# Patient Record
Sex: FEMALE | Race: OTHER | HISPANIC OR LATINO | Employment: FULL TIME | URBAN - METROPOLITAN AREA
[De-identification: names, ages, dates, MRNs, and addresses within clinical notes are randomized per-mention and may not be internally consistent; named-entity substitution may affect disease eponyms.]

---

## 2019-01-06 ENCOUNTER — OFFICE VISIT (OUTPATIENT)
Dept: URGENT CARE | Age: 24
End: 2019-01-06
Payer: COMMERCIAL

## 2019-01-06 VITALS
WEIGHT: 132 LBS | RESPIRATION RATE: 16 BRPM | BODY MASS INDEX: 26.61 KG/M2 | TEMPERATURE: 99.2 F | SYSTOLIC BLOOD PRESSURE: 119 MMHG | HEIGHT: 59 IN | OXYGEN SATURATION: 99 % | DIASTOLIC BLOOD PRESSURE: 68 MMHG | HEART RATE: 97 BPM

## 2019-01-06 DIAGNOSIS — J02.0 STREP PHARYNGITIS: Primary | ICD-10-CM

## 2019-01-06 LAB — S PYO AG THROAT QL: NEGATIVE

## 2019-01-06 PROCEDURE — 87070 CULTURE OTHR SPECIMN AEROBIC: CPT | Performed by: PHYSICIAN ASSISTANT

## 2019-01-06 PROCEDURE — G0382 LEV 3 HOSP TYPE B ED VISIT: HCPCS | Performed by: PHYSICIAN ASSISTANT

## 2019-01-06 PROCEDURE — 87430 STREP A AG IA: CPT | Performed by: PHYSICIAN ASSISTANT

## 2019-01-06 PROCEDURE — 87147 CULTURE TYPE IMMUNOLOGIC: CPT | Performed by: PHYSICIAN ASSISTANT

## 2019-01-06 RX ORDER — AMOXICILLIN 500 MG/1
500 CAPSULE ORAL EVERY 8 HOURS SCHEDULED
Qty: 21 CAPSULE | Refills: 0 | Status: SHIPPED | OUTPATIENT
Start: 2019-01-06 | End: 2019-01-13

## 2019-01-06 NOTE — LETTER
January 6, 2019     Patient: Ruby Rausch   YOB: 1995   Date of Visit: 1/6/2019       To Whom It May Concern: It is my medical opinion that Ruby Rausch may return to work on 01/08/2019  If you have any questions or concerns, please don't hesitate to call           Sincerely,        Demetris Tapia PA-C    CC: No Recipients

## 2019-01-06 NOTE — PROGRESS NOTES
[facility]        NAME: Marbella Cummings is a 20 y o  female  : 1995    MRN: [id number]  DATE: 2019  TIME: 11:24 AM    Assessment and Plan   Strep pharyngitis [J02 0]  1  Strep pharyngitis  POCT rapid strepA    Throat culture    amoxicillin (AMOXIL) 500 mg capsule         Patient Instructions       Take medications as directed  Drink plenty of fluids  Follow up with family doctor this week  Go to ER immediately if new or worsening symptoms occur  Chief Complaint     Chief Complaint   Patient presents with    Sore Throat     since yesterday; fevers for 3 days; white stuff in throat; n/v x 1 yesterday         History of Present Illness       Sore Throat    This is a new problem  Episode onset: 2 days ago  Neither side of throat is experiencing more pain than the other  The maximum temperature recorded prior to her arrival was 100 4 - 100 9 F  The fever has been present for 1 to 2 days  The pain is at a severity of 5/10  The pain is moderate  Associated symptoms include a hoarse voice and swollen glands  Pertinent negatives include no abdominal pain, congestion, coughing, diarrhea, drooling, ear discharge, ear pain, headaches, plugged ear sensation, neck pain, shortness of breath, stridor, trouble swallowing or vomiting  She has had exposure to strep (girlfriend)  She has tried NSAIDs for the symptoms  The treatment provided no relief  Review of Systems   Review of Systems   HENT: Positive for hoarse voice and sore throat  Negative for congestion, drooling, ear discharge, ear pain, rhinorrhea, sinus pain, sinus pressure, sneezing and trouble swallowing  Respiratory: Negative for cough, chest tightness, shortness of breath and stridor  Cardiovascular: Negative for chest pain and palpitations  Gastrointestinal: Negative for abdominal pain, diarrhea, nausea and vomiting  Musculoskeletal: Negative for back pain and neck pain  Skin: Negative for pallor and rash  Neurological: Negative for headaches  Current Medications       Current Outpatient Prescriptions:     amoxicillin (AMOXIL) 500 mg capsule, Take 1 capsule (500 mg total) by mouth every 8 (eight) hours for 7 days, Disp: 21 capsule, Rfl: 0    Current Allergies     Allergies as of 01/06/2019    (No Known Allergies)            The following portions of the patient's history were reviewed and updated as appropriate: allergies, current medications, past family history, past medical history, past social history, past surgical history and problem list      No past medical history on file  No past surgical history on file  No family history on file  Medications have been verified  Objective   /68   Pulse 97   Temp 99 2 °F (37 3 °C)   Resp 16   Ht 4' 11" (1 499 m)   Wt 59 9 kg (132 lb)   LMP 12/18/2018   SpO2 99%   BMI 26 66 kg/m²        Physical Exam     Physical Exam   Constitutional: She appears well-developed and well-nourished  No distress  HENT:   Head: Normocephalic and atraumatic  Right Ear: External ear normal    Left Ear: External ear normal    B/l tonsil swelling  Exudates b/l  Airway patent, handling secretions  Eyes: Conjunctivae are normal  Right eye exhibits no discharge  Left eye exhibits no discharge  Neck: Normal range of motion  Neck supple  Cardiovascular: Normal rate, regular rhythm, normal heart sounds and intact distal pulses  Pulmonary/Chest: Effort normal and breath sounds normal  No respiratory distress  She has no wheezes  She has no rales  Lymphadenopathy:     She has cervical adenopathy (b/l anterior cervical)  Skin: Skin is warm  No rash noted  She is not diaphoretic  Nursing note and vitals reviewed

## 2019-01-06 NOTE — PATIENT INSTRUCTIONS
Take medications as directed  Drink plenty of fluids  Follow up with family doctor this week  Go to ER immediately if new or worsening symptoms occur  Strep Throat   WHAT YOU NEED TO KNOW:   Strep throat is a throat infection caused by bacteria  It is easily spread from person to person  DISCHARGE INSTRUCTIONS:   Call 911 for any of the following:   · You have trouble breathing  Return to the emergency department if:   · You have new symptoms like a bad headache, stiff neck, chest pain, or vomiting  · You are drooling because you cannot swallow your spit  Contact your healthcare provider if:   · You have a fever  · You have a rash or ear pain  · You have green, yellow-brown, or bloody mucus when you cough or blow your nose  · You are unable to drink anything  · You have questions or concerns about your condition or care  Medicines:   · Antibiotics  help treat your strep throat  You should feel better within 2 to 3 days after you start antibiotics  · Take your medicine as directed  Contact your healthcare provider if you think your medicine is not helping or if you have side effects  Tell him or her if you are allergic to any medicine  Keep a list of the medicines, vitamins, and herbs you take  Include the amounts, and when and why you take them  Bring the list or the pill bottles to follow-up visits  Carry your medicine list with you in case of an emergency  Manage your symptoms:   · Use lozenges, ice, soft foods, or popsicles  to soothe your throat  · Drink juice, milk shakes, or soup  if your throat is too sore to eat solid food  Drinking liquids can also help prevent dehydration  · Gargle with salt water  Mix ¼ teaspoon salt in a glass of warm water and gargle  This may help reduce swelling in your throat  · Do not smoke  Nicotine and other chemicals in cigarettes and cigars can cause lung damage and make your symptoms worse   Ask your healthcare provider for information if you currently smoke and need help to quit  E-cigarettes or smokeless tobacco still contain nicotine  Talk to your healthcare provider before you use these products  Return to work or school  24 hours after you start antibiotic medicine  Prevent the spread of strep throat:   · Wash your hands often  Use soap and water  Wash your hands after you use the bathroom, change a child's diapers, or sneeze  Wash your hands before you prepare or eat food  · Do not share food or drinks  Replace your toothbrush after you have taken antibiotics for 24 hours  Follow up with your healthcare provider as directed:  Write down your questions so you remember to ask them during your visits  © 2017 [employer] Information is for End User's use only and may not be sold, redistributed or otherwise used for commercial purposes  All illustrations and images included in CareNotes® are the copyrighted property of [employer]  or [employer]  The above information is an  only  It is not intended as medical advice for individual conditions or treatments  Talk to your doctor, nurse or pharmacist before following any medical regimen to see if it is safe and effective for you

## 2019-01-08 LAB — BACTERIA THROAT CULT: ABNORMAL
